# Patient Record
Sex: FEMALE | Race: OTHER | HISPANIC OR LATINO | Employment: UNEMPLOYED | URBAN - METROPOLITAN AREA
[De-identification: names, ages, dates, MRNs, and addresses within clinical notes are randomized per-mention and may not be internally consistent; named-entity substitution may affect disease eponyms.]

---
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
## 2025-02-05 ENCOUNTER — HOSPITAL ENCOUNTER (EMERGENCY)
Facility: HOSPITAL | Age: 20
Discharge: HOME | End: 2025-02-05
Attending: EMERGENCY MEDICINE

## 2025-02-05 VITALS
HEIGHT: 65 IN | BODY MASS INDEX: 22.8 KG/M2 | RESPIRATION RATE: 16 BRPM | OXYGEN SATURATION: 98 % | TEMPERATURE: 100.2 F | HEART RATE: 116 BPM | DIASTOLIC BLOOD PRESSURE: 78 MMHG | SYSTOLIC BLOOD PRESSURE: 128 MMHG

## 2025-02-05 DIAGNOSIS — J02.0 STREP PHARYNGITIS: ICD-10-CM

## 2025-02-05 DIAGNOSIS — J11.1 INFLUENZA: Primary | ICD-10-CM

## 2025-02-05 LAB
FLUAV AG UPPER RESP QL IA.RAPID: POSITIVE
FLUBV AG UPPER RESP QL IA.RAPID: NEGATIVE
S PYO DNA THROAT QL NAA+PROBE: DETECTED
SARS-COV+SARS-COV-2 AG RESP QL IA.RAPID: NEGATIVE

## 2025-02-05 PROCEDURE — 99284 EMERGENCY DEPT VISIT MOD MDM: CPT | Performed by: EMERGENCY MEDICINE

## 2025-02-05 PROCEDURE — 87804 INFLUENZA ASSAY W/OPTIC: CPT | Performed by: EMERGENCY MEDICINE

## 2025-02-05 PROCEDURE — 87651 STREP A DNA AMP PROBE: CPT | Performed by: EMERGENCY MEDICINE

## 2025-02-05 PROCEDURE — 99283 EMERGENCY DEPT VISIT LOW MDM: CPT

## 2025-02-05 PROCEDURE — 87811 SARS-COV-2 COVID19 W/OPTIC: CPT | Performed by: EMERGENCY MEDICINE

## 2025-02-05 RX ORDER — AMOXICILLIN 250 MG/5ML
500 POWDER, FOR SUSPENSION ORAL 2 TIMES DAILY
Qty: 200 ML | Refills: 0 | Status: SHIPPED | OUTPATIENT
Start: 2025-02-05 | End: 2025-02-15

## 2025-02-05 RX ORDER — AMOXICILLIN 250 MG/5ML
500 POWDER, FOR SUSPENSION ORAL ONCE
Status: COMPLETED | OUTPATIENT
Start: 2025-02-05 | End: 2025-02-05

## 2025-02-05 RX ORDER — IBUPROFEN 600 MG/1
600 TABLET, FILM COATED ORAL ONCE
Status: COMPLETED | OUTPATIENT
Start: 2025-02-05 | End: 2025-02-05

## 2025-02-05 RX ORDER — AMOXICILLIN 250 MG/1
500 CAPSULE ORAL ONCE
Status: DISCONTINUED | OUTPATIENT
Start: 2025-02-05 | End: 2025-02-05

## 2025-02-05 RX ADMIN — IBUPROFEN 600 MG: 600 TABLET, FILM COATED ORAL at 10:03

## 2025-02-05 RX ADMIN — DEXAMETHASONE SODIUM PHOSPHATE 10 MG: 10 INJECTION, SOLUTION INTRAMUSCULAR; INTRAVENOUS at 10:03

## 2025-02-05 RX ADMIN — AMOXICILLIN 500 MG: 250 POWDER, FOR SUSPENSION ORAL at 11:08

## 2025-02-05 NOTE — Clinical Note
Judie Pham was seen and treated in our emergency department on 2/5/2025.    No restrictions            Diagnosis: Flu Magali and strep throat    Judie  may return to school on return date, may return to work on return date.    She may return on this date: 02/10/2025         If you have any questions or concerns, please don't hesitate to call.      Nir Armas MD    ______________________________           _______________          _______________  Hospital Representative                              Date                                Time

## 2025-02-05 NOTE — ED PROVIDER NOTES
HPI: Patient is a 19 y.o. female who presents with 3 days of cough and sore throat which the patient describes as moderate. The patient has had contact with people with similar symptoms.  The patient has not taken any medication.    Allergies   Allergen Reactions    Other Hives     Annotation - 1947: SHRIMP    Not anymore per patient        Past Medical History:   Diagnosis Date    Allergic     Eczema     FTND (full term normal delivery) 2005    Visual impairment       History reviewed. No pertinent surgical history.  Social History     Tobacco Use    Smoking status: Never    Smokeless tobacco: Never   Vaping Use    Vaping status: Never Used   Substance Use Topics    Alcohol use: Never    Drug use: Never       Nursing notes reviewed  Physical Exam:  ED Triage Vitals [02/05/25 0956]   Temperature Pulse Respirations Blood Pressure SpO2   100.5 °F (38.1 °C) (!) 135 16 128/78 97 %      Temp Source Heart Rate Source Patient Position - Orthostatic VS BP Location FiO2 (%)   Oral Monitor Sitting Right arm --      Pain Score       6           ROS: Positive for as stated above in HPI, the remainder of a 10 organ system ROS was otherwise unremarkable.  General: awake, alert, no acute distress    Head: normocephalic, atraumatic    Eyes: no scleral icterus  Ears: external ears normal, hearing grossly intact  Nose: external exam grossly normal, negative nasal discharge  Throat: Mild erythema and swelling.  No exudates.  Uvula midline  Neck: symmetric, No JVD noted, trachea midline  Pulmonary: no respiratory distress, no tachypnea noted, CTAB  Cardiovascular: appears well perfused  Abdomen: no distention noted  Musculoskeletal: no deformities noted, tone normal  Neuro: grossly non-focal  Psych: mood and affect appropriate    Medical Decision Making  The patient is stable and has a history and physical exam consistent with influenza and strep pharyngitis.  Strep pharyngitis testing performed and positive.  COVID19 and  influenza testing has been performed and positive for influenza.  I considered the patient's other medical conditions as applicable/noted above in my medical decision making.  The patient is stable upon discharge.  The patient was provided with a prescription for amoxicillin.  Recommendation was made for the patient to follow-up with her PCP.  Return precautions were discussed.    The patient (and/or family present) agrees with the plan for discharge and feels comfortable to go home with proper follow-up. Diagnostic tests were reviewed. Diagnosis, care plan and treatment options were discussed. All questions were answered prior to discharge. I considered the patient's other medical conditions as applicable/noted above in my medical decision making. Advised the patient to return for worsening or additional problems. The patient (and/or family present) verbalized understanding of the discharge instructions and warnings that would necessitate return to the Emergency Department.          Amount and/or Complexity of Data Reviewed  Labs: ordered.    Risk  Prescription drug management.          Medications   dexamethasone oral liquid 10 mg 1 mL (10 mg Oral Given 2/5/25 1003)   ibuprofen (MOTRIN) tablet 600 mg (600 mg Oral Given 2/5/25 1003)   amoxicillin (Amoxil) oral suspension 500 mg (500 mg Oral Given 2/5/25 1108)     Final diagnoses:   Influenza   Strep pharyngitis     Time reflects when diagnosis was documented in both MDM as applicable and the Disposition within this note       Time User Action Codes Description Comment    2/5/2025 10:50 AM Nir Armas [J11.1] Influenza     2/5/2025 11:07 AM Nir Armas [J02.0] Strep pharyngitis           ED Disposition       ED Disposition   Discharge    Condition   Stable    Date/Time   Wed Feb 5, 2025 10:50 AM    Comment   Judie Pham discharge to home/self care.                   Follow-up Information       Follow up With Specialties Details Why Contact  Info Additional Information    Adrianna Melvin MD  Schedule an appointment as soon as possible for a visit   [facility]P-[employer] ([facility]) - Pediatric Nephrology    [phone]     [facility] Emergency Department Emergency Medicine Go to  If symptoms worsen [address] [address] [phone] [facility] Emergency Department, [address] [address]          Discharge Medication List as of 2/5/2025 11:08 AM        START taking these medications    Details   amoxicillin (Amoxil) 250 mg/5 mL oral suspension Take 10 mL (500 mg total) by mouth 2 (two) times a day for 10 days, Starting Wed 2/5/2025, Until Sat 2/15/2025, Normal           CONTINUE these medications which have NOT CHANGED    Details   Cholecalciferol (Vitamin D) 125 MCG (5000 UT) CAPS Take 5,000 Units by mouth in the morning, Starting Wed 5/17/2023, Normal           No discharge procedures on file.    Electronically Signed by       Nir Armas MD  02/05/25 2738